# Patient Record
Sex: MALE | Race: WHITE | NOT HISPANIC OR LATINO | Employment: FULL TIME | URBAN - METROPOLITAN AREA
[De-identification: names, ages, dates, MRNs, and addresses within clinical notes are randomized per-mention and may not be internally consistent; named-entity substitution may affect disease eponyms.]

---

## 2020-07-02 RX ORDER — SODIUM, POTASSIUM,MAG SULFATES 17.5-3.13G
2 SOLUTION, RECONSTITUTED, ORAL ORAL TAKE AS DIRECTED
Qty: 354 ML | Refills: 0 | Status: SHIPPED | OUTPATIENT
Start: 2020-07-02

## 2020-07-12 ENCOUNTER — APPOINTMENT (OUTPATIENT)
Dept: PREADMISSION TESTING | Facility: HOSPITAL | Age: 55
End: 2020-07-12

## 2022-07-21 ENCOUNTER — TELEPHONE (OUTPATIENT)
Dept: FAMILY MEDICINE CLINIC | Facility: CLINIC | Age: 57
End: 2022-07-21

## 2022-07-21 NOTE — TELEPHONE ENCOUNTER
Caller: NIXON CALERO    Relationship to patient: Emergency Contact, WIFE    Best call back number: [phone]    New or established patient?  [] New  [x] Established    Date of discharge: 7/20/22    Facility discharged from: [facility]    Diagnosis/Symptoms: OPEN WOUND FROM A TICK BITE    Length of stay (If applicable): ONE DAY THIS TIME, 8 DAYS LAST VISIT FOR SAME TICK BITE EARLY JUNE    Specialty Only: Did you see a [employer] health provider?    [] Yes  [] No NOT SURE- HAD MANY CONSULTS DURING VISIT.    ONE OF THE PROVIDERS THAT HE SAW IN THE HOSPITAL THINKS IT MAY BE ALL THE WAY TO THE BONE. WIFE WORRIED HE MAY LOSE HIS FOOT.     PHARMACY ASKED THEM WHY HE IS TAKING 2 ANTIBIOTICS AT THE SAME TIME STATING THIS IS UNUSUAL- THIS HAS WIFE AND PATIENT CONCERNED.    STATES THIS HAS BEEN GOING ON SINCE EARLY June- WANTING TO BE SEEN AS SOON AS POSSIBLE- REQUESTING TODAY OR TOMORROW- DOESN'T WANT TO GO THE WEEKEND.